# Patient Record
Sex: FEMALE | Race: WHITE | URBAN - METROPOLITAN AREA
[De-identification: names, ages, dates, MRNs, and addresses within clinical notes are randomized per-mention and may not be internally consistent; named-entity substitution may affect disease eponyms.]

---

## 2021-05-24 ENCOUNTER — RECORDS - HEALTHEAST (OUTPATIENT)
Dept: ADMINISTRATIVE | Facility: CLINIC | Age: 66
End: 2021-05-24

## 2021-05-30 ENCOUNTER — RECORDS - HEALTHEAST (OUTPATIENT)
Dept: ADMINISTRATIVE | Facility: CLINIC | Age: 66
End: 2021-05-30

## 2022-12-20 ENCOUNTER — TRANSFERRED RECORDS (OUTPATIENT)
Dept: HEALTH INFORMATION MANAGEMENT | Facility: CLINIC | Age: 67
End: 2022-12-20

## 2022-12-20 ENCOUNTER — MEDICAL CORRESPONDENCE (OUTPATIENT)
Dept: HEALTH INFORMATION MANAGEMENT | Facility: CLINIC | Age: 67
End: 2022-12-20

## 2022-12-21 ENCOUNTER — TRANSCRIBE ORDERS (OUTPATIENT)
Dept: OTHER | Age: 67
End: 2022-12-21

## 2022-12-21 DIAGNOSIS — M81.0 AGE-RELATED OSTEOPOROSIS WITHOUT CURRENT PATHOLOGICAL FRACTURE: ICD-10-CM

## 2022-12-21 DIAGNOSIS — E83.52 HYPERCALCEMIA: Primary | ICD-10-CM

## 2023-03-16 NOTE — TELEPHONE ENCOUNTER
RECORDS RECEIVED FROM: internal /ce   DATE RECEIVED: 6.5.23  Hypercalcemia; Age-related osteoporosis    NOTES (FOR ALL VISITS) STATUS DETAILS   OFFICE NOTES from referring provider internal  Bibiana Salter CNP   OFFICE NOTES from other specialist ce [facility]- 12.20.22 -endo  10.31.22- Obi   12.29.21 Joie    ED NOTES ce [facility]- 12.6.22 Shapin   11.29.22 Estiven  9.1.22-Obi   OPERATIVE REPORT  (thyroid, pituitary, adrenal, parathyroid)     MEDICATION LIST internal       LABS     DIABETES: HBGA1C, CREATININE, FASTING LIPIDS, MICROALBUMIN URINE, POTASSIUM, TSH, T4    THYROID: TSH, T4, CBC, THYRODLONULIN, TOTAL T3, FREE T4, CALCITONIN, CEA ce  Calcium- 9.12.22  CREATININE- 9.12.22  Parathyroid- 9.1.22  Vitamin D- 9.1.22  BMP- 7/1/22  potassum- 6.28.21  Lipid- 7/1/22  MICROALBUMIN - 6.25.24

## 2023-05-22 ENCOUNTER — TELEPHONE (OUTPATIENT)
Dept: ENDOCRINOLOGY | Facility: CLINIC | Age: 68
End: 2023-05-22
Payer: COMMERCIAL

## 2023-05-22 NOTE — TELEPHONE ENCOUNTER
Appointment Information and Google Maps map sent to pt. Via [employer].   Hyacinth Perez RN on 5/22/2023 at 1:45 PM     [facility] Call Center    Phone Message    May a detailed message be left on voicemail: yes     Reason for Call: Other: .      Per Patient is wanting to get directions to the clinic from her house sent to her at the address below. Patient states her  did not understand the directions from what was given to the patient via phone. Patient is wanting to get a call back when this has been done. Please advise    [address]   [address]  [place] [address]      Action Taken: Message routed to:  [facility]o    Travel Screening: Not Applicable

## 2023-06-05 ENCOUNTER — LAB (OUTPATIENT)
Dept: LAB | Facility: CLINIC | Age: 68
End: 2023-06-05
Payer: COMMERCIAL

## 2023-06-05 ENCOUNTER — PRE VISIT (OUTPATIENT)
Dept: ENDOCRINOLOGY | Facility: CLINIC | Age: 68
End: 2023-06-05

## 2023-06-05 ENCOUNTER — OFFICE VISIT (OUTPATIENT)
Dept: ENDOCRINOLOGY | Facility: CLINIC | Age: 68
End: 2023-06-05
Attending: NURSE PRACTITIONER
Payer: COMMERCIAL

## 2023-06-05 VITALS
HEIGHT: 66 IN | WEIGHT: 147.9 LBS | DIASTOLIC BLOOD PRESSURE: 86 MMHG | HEART RATE: 76 BPM | BODY MASS INDEX: 23.77 KG/M2 | OXYGEN SATURATION: 96 % | SYSTOLIC BLOOD PRESSURE: 147 MMHG

## 2023-06-05 DIAGNOSIS — M81.0 AGE-RELATED OSTEOPOROSIS WITHOUT CURRENT PATHOLOGICAL FRACTURE: ICD-10-CM

## 2023-06-05 DIAGNOSIS — E83.52 HYPERCALCEMIA: ICD-10-CM

## 2023-06-05 DIAGNOSIS — E21.3 HYPERPARATHYROIDISM (H): Primary | ICD-10-CM

## 2023-06-05 LAB
ALBUMIN SERPL BCG-MCNC: 4 G/DL (ref 3.5–5.2)
CALCIUM SERPL-MCNC: 11.9 MG/DL (ref 8.8–10.2)
CREAT SERPL-MCNC: 0.92 MG/DL (ref 0.51–0.95)
DEPRECATED CALCIDIOL+CALCIFEROL SERPL-MC: 21 UG/L (ref 20–75)
GFR SERPL CREATININE-BSD FRML MDRD: 68 ML/MIN/1.73M2
MAGNESIUM SERPL-MCNC: 2.3 MG/DL (ref 1.7–2.3)
PHOSPHATE SERPL-MCNC: 2.2 MG/DL (ref 2.5–4.5)
PTH-INTACT SERPL-MCNC: 132 PG/ML (ref 15–65)

## 2023-06-05 PROCEDURE — 82310 ASSAY OF CALCIUM: CPT | Performed by: PATHOLOGY

## 2023-06-05 PROCEDURE — 99204 OFFICE O/P NEW MOD 45 MIN: CPT | Performed by: INTERNAL MEDICINE

## 2023-06-05 PROCEDURE — 82565 ASSAY OF CREATININE: CPT | Performed by: PATHOLOGY

## 2023-06-05 PROCEDURE — 82040 ASSAY OF SERUM ALBUMIN: CPT | Performed by: PATHOLOGY

## 2023-06-05 PROCEDURE — 84100 ASSAY OF PHOSPHORUS: CPT | Performed by: PATHOLOGY

## 2023-06-05 PROCEDURE — 83970 ASSAY OF PARATHORMONE: CPT | Performed by: PATHOLOGY

## 2023-06-05 PROCEDURE — 36415 COLL VENOUS BLD VENIPUNCTURE: CPT | Performed by: PATHOLOGY

## 2023-06-05 PROCEDURE — 82306 VITAMIN D 25 HYDROXY: CPT | Performed by: PATHOLOGY

## 2023-06-05 PROCEDURE — 83735 ASSAY OF MAGNESIUM: CPT | Performed by: PATHOLOGY

## 2023-06-05 RX ORDER — HYDROCHLOROTHIAZIDE 25 MG/1
25 TABLET ORAL
COMMUNITY

## 2023-06-05 RX ORDER — BUPROPION HYDROCHLORIDE 200 MG/1
TABLET, EXTENDED RELEASE ORAL EVERY 12 HOURS
COMMUNITY
Start: 2022-07-01

## 2023-06-05 RX ORDER — LISINOPRIL 20 MG/1
TABLET ORAL
COMMUNITY
Start: 2023-05-12

## 2023-06-05 RX ORDER — ARIPIPRAZOLE 5 MG/1
1 TABLET ORAL DAILY
COMMUNITY
Start: 2022-07-01

## 2023-06-05 ASSESSMENT — PAIN SCALES - GENERAL: PAINLEVEL: NO PAIN (0)

## 2023-06-05 NOTE — NURSING NOTE
Chief Complaint   Patient presents with     New Patient     Hypercalcemia      Abraham Valdez CMA

## 2023-06-05 NOTE — PROGRESS NOTES
Endocrine Consult note    Attending Assessment/Plan :        Hypercalcemia  Age-related osteoporosis without current pathological fracture    Assessment:  -likely primary hyperparathyroidism given last labs  -serum calcium may be sightly elevated higher from hydrochlorothiazide but wouldn't expect this to cause marked hypercalcemia and especially not markedly elevated PTH  -vitamin D deficiency may be contributing slightly to elevated PTH.  Could consider low dose replacement as studies have shown in this setting doses of 400-800 international unit(s) vitamin D do not significantly raise calcium levels.  Higher doses will so would avoid that  -given osteoporosis, pt would be candidate for parathyroidectomy if labs confirm this diagnosis    Plan:  -recheck PHPT labs  -if labs consistent with PHPT, will order sestamibi and ENT referral  -instructed pt that if she gets referred to ENT and they decide on parathyroidectomy she should call our office as soon as her surgery date is scheduled to make an appt 2-3 months post op for calcium recheck.  She could also see her PCP for this followup locally    I have independently reviewed and interpreted labs, imaging as indicated.    RTC prn (or 2-3 months post op if having parathyroidectomy and if she would like me to see her for followup vs PCP)    Chief complaint:  Erma is a 67 year old female seen in consultation for metabolic bone.      HISTORY OF PRESENT ILLNESS    Erma is a 67F with pmh osteoporosis and hypercalcemia, here for a second opinion on hyperparathyroidism.     Has gotten reclast 3-4 times for osteoporosis. I do not have access to any dexa reports for her.     See below for previous labs.  Unclear if 24 hour urine was collected while on hydrochlorothiazide or not.  Pt did meet with surgeon up in [facility] who recommended parathyroidectomy.  She would like to have the operation done at [facility] if indicated.     She is not having mental status changes or  "chronic constipation.     No known hx of nephrolithiasis.     Also has vitamin D deficiency.  Not on vitamin D supplement currently due to hypercalcemia.       Endocrine relevant labs and/or imaging are as follows:  Labs done at OSH in Sept 2022:    24 hour urine Ca 65mg (13 mg/dL)  24 hour urine Cr 0.72g (144.8 mg/dL)  Serum Ca 11.4 mg/dL  Serum Cr 0.77    Phos 1.8  .9    Vitamin D 21.6  Most recent DEXA scan done 7/13/21 shows:  Left Hip     Femoral Neck:   BMD: 0.504 (g/cm )   T-score: -3.1     Total Hip:   BMD: 0.586 (g/cm )     T-score: -2.9     WHO classification: Osteoporosis.   BMD Comparison: (positive = increase, negative = decrease)   Baseline:  0.0%    Not Statistically Significant     Left Distal Forearm (One-Third Radius)     BMD:0.591 (g/cm )     T-score: -1.6     WHO classification: Low Bone Mass.   BMD Comparison: (positive = increase, negative = decrease)   Baseline:  -0.4%    Not Statistically Significant         REVIEW OF SYSTEMS    10 system ROS otherwise as per the HPI or negative    Past Medical History  History reviewed. No pertinent past medical history.    Medications  Current Outpatient Medications   Medication Sig Dispense Refill     acetaminophen 500 MG CAPS Take 500 mg by mouth every 6 hours as needed       ARIPiprazole (ABILIFY) 5 MG tablet Take 1 tablet by mouth daily       buPROPion (WELLBUTRIN SR) 200 MG 12 hr tablet Take by mouth every 12 hours       hydrochlorothiazide (HYDRODIURIL) 25 MG tablet Take 25 mg by mouth       lisinopril (ZESTRIL) 20 MG tablet        omeprazole (PRILOSEC) 20 MG DR capsule Take 1 capsule by mouth daily before breakfast         Allergies  No Known Allergies      Family History  family history is not on file.    Social History  Social History     Tobacco Use     Smoking status: Never       Physical Exam  BP (!) 147/86   Pulse 76   Ht 1.676 m (5' 6\")   Wt 67.1 kg (147 lb 14.4 oz)   SpO2 96%   BMI 23.87 kg/m    Body mass index is 23.87 " kg/m .    Physical Exam    GENERAL :  In no apparent distress  SKIN: Normal color, normal temperature     EYES: EOMI, No scleral icterus  NECK: No visible masses  RESP: No respiratory distress, normal effort  CARDIO: regular rate  ABDOMEN: flat, nondistended       NEURO: awake, alert, responds appropriately to questions   EXTREMITIES: No clubbing, cyanosis or edema.    I spent a total of 47 minutes on the day of this new patient visit on chart review, lab review, coordinating care, exam and counseling of patient

## 2023-06-06 ENCOUNTER — TELEPHONE (OUTPATIENT)
Dept: ENDOCRINOLOGY | Facility: CLINIC | Age: 68
End: 2023-06-06

## 2023-06-06 NOTE — TELEPHONE ENCOUNTER
Spoke w/ Pt: Confirms understanding of review and recommendation from Dr Anders. No questions at this time.   Hyacinth Perez, RN on 6/6/2023 at 10:44 AM           RE    Message  Received: Today  Sedrick Anders MD  [facility]  Please let patient know labs came back confirming overactive parathyroid gland.  I have placed orders for the nuclear medicine parathyroid scan and ENT referral so she should be hearing from both of those to schedule.  We will hold off on starting vitamin D supplement for now.     If/when a surgery date gets scheduled after seeing ENT she should make a followup with PCP or me about 2-3 months post op for lab recheck.

## 2023-06-07 ENCOUNTER — TELEPHONE (OUTPATIENT)
Dept: ENDOCRINOLOGY | Facility: CLINIC | Age: 68
End: 2023-06-07
Payer: COMMERCIAL

## 2023-06-07 NOTE — TELEPHONE ENCOUNTER
Patient would like for the ENT Referral to be sent to [facility] in [place] on [facility] this is all the information she was able to provide.

## 2023-06-07 NOTE — TELEPHONE ENCOUNTER
Per Patient request I faxed Dr Anders's ENT referral and parathyroid scan order to [facility] at [phone].  Radha Aguilar

## 2023-06-09 NOTE — TELEPHONE ENCOUNTER
FUTURE VISIT INFORMATION:      FUTURE VISIT INFORMATION:    Date: 7/17/23    Time: 2:40 PM    Location: [facility]  REFERRAL INFORMATION:    Referring provider: Sedrick Anders MD    Referring providers clinic: [facility]    Reason for visit/diagnosis:  M81.0 (ICD-10-CM) - Age-related osteoporosis without current pathological fracture; E21.3 (ICD-10-CM) - Hyperparathyroidism Referral from Sedrick Anders, Referral notes in EPIC, Pt appt in [facility] location    RECORDS REQUESTED FROM:       Clinic name Comments Records Status Imaging Status   [facility] 6/5/23 note- Sedrick Anders MD Epic    Imaging 10/12/18 [place] PARATHYROID CE Pending  req 6/9/23  PACS   [facility] 11/29/2022  Gen Surg note - Eliceo Jean-Baptiste MD  10/31/2022 Endo note- Tyler Esparza MD              June 9, 2023 at 8:03 AM - request image pushed to [facility] -Yancy  June 20, 2023 at 3:07 PM - Images resolved. -Yancy    *Appt cancel with ENT

## 2023-07-17 ENCOUNTER — PRE VISIT (OUTPATIENT)
Dept: OTOLARYNGOLOGY | Facility: CLINIC | Age: 68
End: 2023-07-17

## 2023-07-27 ENCOUNTER — TELEPHONE (OUTPATIENT)
Dept: ENDOCRINOLOGY | Facility: CLINIC | Age: 68
End: 2023-07-27
Payer: COMMERCIAL

## 2023-07-27 NOTE — TELEPHONE ENCOUNTER
[facility] for Dr Cailin Garcia. There is no referral from her in place. However, Pt is already seen by Dr Anders. Clinic number provided for questions.   Hyacinth Perez RN on 7/28/2023 at 8:58 AM       [facility] Call Center    Phone Message    May a detailed message be left on voicemail: yes     Reason for Call: Other: Dr. Garcia would like a call from Dr. Anders in regards to this patient he referred to her. Please contact the doctor at [phone]     Action Taken: Other: endo    Travel Screening: Not Applicable

## 2023-07-28 NOTE — TELEPHONE ENCOUNTER
Per call from DR Garcia's nurse, there is a miss communication, Dr Garcia would like to speak with Dr Anders regarding his referral to Dr Garcia. Dr Garcia believes patient's condition may be caused by patient's Hypercalciuria and PTH instead please have Dr Wasson call Dr Gracia to discuss at [phone] provider's cell phone

## 2023-08-11 NOTE — TELEPHONE ENCOUNTER
Dr. Garcia calling again requesting a call back.     Writer will send a secure chat message.    [facility]

## 2023-08-17 DIAGNOSIS — E21.3 HYPERPARATHYROIDISM (H): Primary | ICD-10-CM

## 2023-08-17 NOTE — PROGRESS NOTES
Discussed labs with ENT.  Unclear whether or not urine studies were done off thiazide.  Because the studies may imply FHH, would be best to repeat them after holding thiazide for 1 week.  Will order new 24 hour urine collection and have patient get serum calcium and creatinine the day she submits sample.     Attempted to call patient but did not reach her directly.  She did have identified VM so left her a message detailing above.

## 2023-09-01 ENCOUNTER — TELEPHONE (OUTPATIENT)
Dept: ENDOCRINOLOGY | Facility: CLINIC | Age: 68
End: 2023-09-01
Payer: COMMERCIAL

## 2023-09-01 NOTE — TELEPHONE ENCOUNTER
Health Call Center    Phone Message    May a detailed message be left on voicemail: yes     Reason for Call: Order(s): Other:     Reason for requested: Per Patient is wanting to have the orders that Dr. Anders is wanting patient to have done be sent [facility] Phone: [phone]. Patient is wanting to get a call back when this has been done to be able to schedule. Please advise.     Date needed: asap    Provider name: Chago    Action Taken: Message routed to:  [facility] Endo    Travel Screening: Not Applicable

## 2023-09-01 NOTE — TELEPHONE ENCOUNTER
Labs orders faxed to [facility] fax # [phone]. Called pt N/A left a message for pt that lab orders been faxed.      Juan Gallagher -General Care Navigator

## 2023-09-13 ENCOUNTER — TRANSFERRED RECORDS (OUTPATIENT)
Dept: HEALTH INFORMATION MANAGEMENT | Facility: CLINIC | Age: 68
End: 2023-09-13
Payer: COMMERCIAL

## 2023-09-18 ENCOUNTER — TELEPHONE (OUTPATIENT)
Dept: ENDOCRINOLOGY | Facility: CLINIC | Age: 68
End: 2023-09-18
Payer: COMMERCIAL

## 2023-09-18 DIAGNOSIS — E83.52 HYPERCALCEMIA: Primary | ICD-10-CM

## 2023-09-18 DIAGNOSIS — E83.59 HYPOCALCIURIA: ICD-10-CM

## 2023-09-18 NOTE — TELEPHONE ENCOUNTER
[facility] Call Center    Phone Message    May a detailed message be left on voicemail: yes     Reason for Call: Other: Per pt would like to know about the lab results she had. Per pt missed the call and is now carious. Please call her back. Thank you!     Action Taken: Message routed to:  [facility] ENDO    Travel Screening: Not Applicable

## 2023-09-26 NOTE — TELEPHONE ENCOUNTER
[facility] and asked for test results as well, please contact patient asap thank you

## 2023-09-26 NOTE — TELEPHONE ENCOUNTER
Pt called back again and is still waiting results of her lab tests. She would like a call back from the care team to go over them

## 2023-10-02 NOTE — TELEPHONE ENCOUNTER
[facility] Call Center    Phone Message    May a detailed message be left on voicemail: yes     Reason for Call: Other: Per pt would like if someone can call him back about his resylts. Per pt has waited for about 2-3 weeks now. Please and thank you!     Action Taken: Message routed to:  [facility] ENDO    Travel Screening: Not Applicable

## 2023-10-03 NOTE — TELEPHONE ENCOUNTER
Called patient to discuss labs after review.  Repeat labs do appear consistent with FHH, for which parathyroidectomy would not be indicated.  Given the degree of hypercalcemia, we should pursue genetic testing for FHH to very for sure.     Discussed with patient who is agreeable to seeing genetic counseling for this.  If genetic testing is negative, we may proceed with moderate dietary calcium intake of ~1000mg daily and repeat 24 hour urine to see if this changes the calcium secretion - she currently limits dairy due to her hypercalcemia.     Will alert pts ENT as well to plan.

## 2023-11-06 ENCOUNTER — TELEPHONE (OUTPATIENT)
Dept: ENDOCRINOLOGY | Facility: CLINIC | Age: 68
End: 2023-11-06
Payer: COMMERCIAL

## 2023-11-06 NOTE — TELEPHONE ENCOUNTER
[facility] Call Center    Phone Message    May a detailed message be left on voicemail: yes     Reason for Call: Other: See 9/18/23 tel enc: per Dr. Anders, he was going to be making referral for pt to have genetic testing done, pt calling to check on that status of that referral? No referral seen, please advise.      Action Taken: Other: Endo    Travel Screening: Not Applicable

## 2023-11-10 DIAGNOSIS — E83.52 HYPERCALCEMIA: Primary | ICD-10-CM

## 2023-11-10 DIAGNOSIS — E83.59 HYPOCALCIURIA: ICD-10-CM

## 2023-11-13 NOTE — TELEPHONE ENCOUNTER
Patient notified that referral for genetics was placed. By Dr Chago Heath, RN on 11/13/2023 at 8:35 AM

## 2023-12-07 ENCOUNTER — VIRTUAL VISIT (OUTPATIENT)
Dept: CONSULT | Facility: CLINIC | Age: 68
End: 2023-12-07
Attending: INTERNAL MEDICINE
Payer: COMMERCIAL

## 2023-12-07 DIAGNOSIS — E83.52 HYPERCALCEMIA: ICD-10-CM

## 2023-12-07 DIAGNOSIS — M81.0 AGE-RELATED OSTEOPOROSIS WITHOUT CURRENT PATHOLOGICAL FRACTURE: Primary | ICD-10-CM

## 2023-12-07 DIAGNOSIS — E83.59 HYPOCALCIURIA: ICD-10-CM

## 2023-12-07 PROCEDURE — 96040 HC GENETIC COUNSELING, EACH 30 MINUTES: CPT | Mod: TEL,95 | Performed by: GENETIC COUNSELOR, MS

## 2023-12-07 ASSESSMENT — PAIN SCALES - GENERAL: PAINLEVEL: NO PAIN (0)

## 2023-12-07 NOTE — NURSING NOTE
Is the patient currently in the state of [place]? YES    Visit mode:TELEPHONE    If the visit is dropped, the patient can be reconnected by: TELEPHONE VISIT: Phone number:   Telephone Information:   Mobile [phone]       Will anyone else be joining the visit? NO  (If patient encounters technical issues they should call [phone])    How would you like to obtain your AVS? MyChart    Are changes needed to the allergy or medication list? No    Reason for visit: No chief complaint on file.    Evelina BLANDF

## 2023-12-07 NOTE — LETTER
"2023      RE: Erma Gilliland  [address]   [address]  [place] [address]     Dear Colleague,    Thank you for the opportunity to participate in the care of your patient, Erma Gilliland, at the [facility] at [facility]. Please see a copy of my visit note below.    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     Erma was unaccompanied to this visit    History is obtained from Patient and electronic health record. I met with the family at the request of Dr. Sedrick Anders to obtain a personal and family history, discuss possible genetic contributions to her symptoms, and to obtain informed consent for genetic testing if indicated.      ASSESSMENT & PLAN  Erma is a 68 year old-year old female with hyperparathyroidism, osteoporosis, and hypercalcemia/hypocalcuria. Family history is significant for mother with hyperparathyroidism, hypercalcemia and osteoporosis.     Familial hypocalciuric hypercalcemia (FHH) is a rare genetic condition that can cause hypercalcemia and hypocalcuria.  It usually occurs due to pathogenic variants in the calcium-sensing receptor gene (CASR) gene that lead to decreased receptor activity. Erma's labs and dominant family history are consistent with this condition, so genetic testing is indicated. This will be used to determine what treatment she requires (e.g. parathyroidectomy is not indicated in FHH). It can also inform risk to relatives and allow for cascade testing. A three gene panel will be sent to [facility]: " AP2S1, CASR, GNA11. Erma provided informed consent, signed with verbal permission (COVID19)    buccal sample will be collected and sent to [facility] for custom [facility] panel (AP2S1, CASR, GNA11).   Testing will be billed through insurance. If the cost of testing is >$100, [employer] will call you to discuss payment options  After testing is initiated, results will be returned by phone in 10-21 days. Follow-up dependent on results  Contact information was provided should any questions arise in the future.    Addendum 12/21/23 received call from Erma about registering her sample with [facility].  She does not need to register the kit.  Provided instructions to return the sample.  Recommended she do over the MVR possible.     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  Her features include hyperparathyroidism and osteoporosis. She has gotten reclast 3-4 times for osteoporosis.  [facility] surgery recommended parathyroidectomy.  She was seen for a second opinion at [facility] endocrinology.  Based on her labs suspicious for hypercalcemic hypercalciuria, genetic testing for today's appointment was recommended.  This will be used to determine if she needs parathyroidectomy. She also has vitamin D deficiency.     No birth defects, delays, ID, HL, VL.  Reports dyslexia      Patient Active Problem List   Diagnosis    Hypercalcemia    Age-related osteoporosis without current pathological fracture       Pertinent studies/abnormal test results:   No history of genetic testing    Past Medical History:  No past medical history on file.    Past Surgical History:  No past surgical history on file.     FAMILY HISTORY  A three generation pedigree was obtained today and scanned into the EMR. The following information is significant:    Siblings  Full siblings: 2 well sisters  Paternal half siblings: None  Maternal half siblings: None    Maternal Family  Mother, Data  Unavailable: .  She had a history of osteoporosis diagnosed between 50-60, hyperparathyroidism, and hypercalcemia.  She passed due to colorectal cancer diagnosed at 84  Maternal grandfather: Unknown  Maternal grandmother: Unknown  Maternal aunts/uncles: Uncle who passed due to brain cancer diagnosed from 70-80  Maternal cousins: ?well  Maternal ancestry: Deferred    Paternal Family  Father, Data Unavailable: Well  Paternal grandfather: Unknown  Paternal grandmother: Unknown  Paternal aunts/uncles: Aunt who passed due to brain cancer diagnosed at ?70  Paternal cousins: ?well  Paternal ancestry: Deferred    The family history is otherwise negative for hypercalcemia, hypercalciuria, hyperparathyroidism, autoimmune conditions, failure to thrive, multiple fractures, osteoporosis, fatigue, renal insufficiency, chondrocalcinosis, calcifications, pancreatitis, and known genetic disorders. Consanguinity is denied.    DISCUSSION  Today we reviewed that our genetic material or DNA is responsible for how our bodies grow and develop. It can be thought of as an instruction manual. This instruction manual is made up of chapters called genes. Our genes are inherited on structures called chromosomes, of which we have 23 pairs for a total of 46. For each chromosome pair, one copy is inherited from the mother and one is inherited from the father. The chromosome pairs are numbered from 1 to 22, and the 23rd pair of chromsomes is called the sex chromsomes. These determine if we are a male or female.     Changes in the DNA sequence of a gene can cause the signs and symptoms of a genetic condition because the instructions it is providing to the body have been altered. This can be a small spelling error in the gene, a large duplicated piece of information, or a large missing piece of information.     Due to Erma's hyperparathyroidism, osteoporosis, and labs, genetic testing for familial hypercalcemic hypercalciuria is  indicated.  This will be sent to [facility].  They will analyze 3 genes that are known to cause this condition. The potential results were discussed including a positive, negative, or variant of uncertain significance.     A positive result occurs when a harmful genetic variant is found within a gene that causes the gene to malfunction. A positive result may provide more information on appropriate clinical management for Erma and may provide information on additional potential health risks associated with Erma's diagnosis.  A positive result can also have implications for the health and reproductive risks of other relatives.  A negative result occurs when no harmful genetic variants are found in the genes tested. A negative result would not completely rule out a possible genetic cause for Erma's symptoms.  Not all variants in our genes cause disease.  Sometimes, it can be difficult for the laboratory to determine whether or not a variant in a gene would cause it to malfunction.  In these scenarios, the lab classifies the result as a variant of unknown significance (VUS). Follow-up testing of relatives may be beneficial in clarifying the meaning of this result.    Management and surveillance of Erma will depend on the genetic test results. It can also help predict the recurrence risk for Erma and other family members to have another child with similar healthcare needs.    Billing  A buccal swab will be mailed to the home. The family must collect, label, and mail back the sample. The label must include name, date of birth, and date of collection. Detailed instructions and a prepaid shipping envelope are included in the box..     You will receive a text/email from [facility] once they receive the sample. Testing can be billed one of two ways: patient pay or insurance billing. Patient-pay bills the patient directly at a reduced cost ($250 for panels) and does not utilize insurance. [facility] accepts  Medicare (including managed Medicare) and Medicaid (including managed Medicaid) for all panels.    Although patients may receive an explanation of benefits (EOB) from Medicare or Medicaid, an EOB is not a bill. Most patients who have Medicare or Medicaid will not receive a bill from [facility].If the out-of-pocket cost is over $100, [employer] will call you once to discuss payment options.          Approximate Time Spent in Consultation: 30 min         This note was written with the assistance of voice recognition software and may contain occasional typographic errors. Please contact our office if you identify errors requiring correction.    Virtual Visit Details    Type of service:  Telephone Visit   Phone call duration: 30 minutes

## 2023-12-07 NOTE — PROGRESS NOTES
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     Erma was unaccompanied to this visit    History is obtained from Patient and electronic health record. I met with the family at the request of Dr. Sedrick Anders to obtain a personal and family history, discuss possible genetic contributions to her symptoms, and to obtain informed consent for genetic testing if indicated.      ASSESSMENT & PLAN  Erma is a 68 year old-year old female with hyperparathyroidism, osteoporosis, and hypercalcemia/hypocalcuria. Family history is significant for mother with hyperparathyroidism, hypercalcemia and osteoporosis.     Familial hypocalciuric hypercalcemia (FHH) is a rare genetic condition that can cause hypercalcemia and hypocalcuria.  It usually occurs due to pathogenic variants in the calcium-sensing receptor gene (CASR) gene that lead to decreased receptor activity. Erma's labs and dominant family history are consistent with this condition, so genetic testing is indicated. This will be used to determine what treatment she requires (e.g. parathyroidectomy is not indicated in FHH). It can also inform risk to relatives and allow for cascade testing. A three gene panel will be sent to [facility]: AP2S1, CASR, GNA11. Erma provided informed consent, signed with verbal permission ([email or web address])    buccal sample will be collected and sent to [facility] for custom FHH panel (AP2S1, CASR, GNA11).   Testing will be billed through insurance. If the cost of testing is >$100, [facility] will call you to discuss payment options  After testing is initiated, results will be returned by " phone in 10-21 days. Follow-up dependent on results  Contact information was provided should any questions arise in the future.    Addendum 23 received call from Erma about registering her sample with [employer].  She does not need to register the kit.  Provided instructions to return the sample.  Recommended she do over the MVR possible.     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  Her features include hyperparathyroidism and osteoporosis. She has gotten reclast 3-4 times for osteoporosis.  [facility] surgery recommended parathyroidectomy.  She was seen for a second opinion at [facility] endocrinology.  Based on her labs suspicious for hypercalcemic hypercalciuria, genetic testing for today's appointment was recommended.  This will be used to determine if she needs parathyroidectomy. She also has vitamin D deficiency.     No birth defects, delays, ID, HL, VL.  Reports dyslexia      Patient Active Problem List   Diagnosis    Hypercalcemia    Age-related osteoporosis without current pathological fracture       Pertinent studies/abnormal test results:   No history of genetic testing    Past Medical History:  No past medical history on file.    Past Surgical History:  No past surgical history on file.     FAMILY HISTORY  A three generation pedigree was obtained today and scanned into the EMR. The following information is significant:    Siblings  Full siblings: 2 well sisters  Paternal half siblings: None  Maternal half siblings: None    Maternal Family  Mother, Data Unavailable: .  She had a history of osteoporosis diagnosed between 50-60, hyperparathyroidism, and hypercalcemia.  She passed due to colorectal cancer diagnosed at 84  Maternal grandfather: Unknown  Maternal grandmother: Unknown  Maternal aunts/uncles: Uncle who passed due to brain cancer diagnosed from 70-80  Maternal cousins: ?well  Maternal ancestry:  Deferred    Paternal Family  Father, Data Unavailable: Well  Paternal grandfather: Unknown  Paternal grandmother: Unknown  Paternal aunts/uncles: Aunt who passed due to brain cancer diagnosed at ?70  Paternal cousins: ?well  Paternal ancestry: Deferred    The family history is otherwise negative for hypercalcemia, hypercalciuria, hyperparathyroidism, autoimmune conditions, failure to thrive, multiple fractures, osteoporosis, fatigue, renal insufficiency, chondrocalcinosis, calcifications, pancreatitis, and known genetic disorders. Consanguinity is denied.    DISCUSSION  Today we reviewed that our genetic material or DNA is responsible for how our bodies grow and develop. It can be thought of as an instruction manual. This instruction manual is made up of chapters called genes. Our genes are inherited on structures called chromosomes, of which we have 23 pairs for a total of 46. For each chromosome pair, one copy is inherited from the mother and one is inherited from the father. The chromosome pairs are numbered from 1 to 22, and the 23rd pair of chromsomes is called the sex chromsomes. These determine if we are a male or female.     Changes in the DNA sequence of a gene can cause the signs and symptoms of a genetic condition because the instructions it is providing to the body have been altered. This can be a small spelling error in the gene, a large duplicated piece of information, or a large missing piece of information.     Due to Erma's hyperparathyroidism, osteoporosis, and labs, genetic testing for familial hypercalcemic hypercalciuria is indicated.  This will be sent to [facility].  They will analyze 3 genes that are known to cause this condition. The potential results were discussed including a positive, negative, or variant of uncertain significance.     A positive result occurs when a harmful genetic variant is found within a gene that causes the gene to malfunction. A positive result may provide more  information on appropriate clinical management for Erma and may provide information on additional potential health risks associated with Erma's diagnosis.  A positive result can also have implications for the health and reproductive risks of other relatives.  A negative result occurs when no harmful genetic variants are found in the genes tested. A negative result would not completely rule out a possible genetic cause for Erma's symptoms.  Not all variants in our genes cause disease.  Sometimes, it can be difficult for the laboratory to determine whether or not a variant in a gene would cause it to malfunction.  In these scenarios, the lab classifies the result as a variant of unknown significance (VUS). Follow-up testing of relatives may be beneficial in clarifying the meaning of this result.    Management and surveillance of Erma will depend on the genetic test results. It can also help predict the recurrence risk for Eram and other family members to have another child with similar healthcare needs.    Billing  A buccal swab will be mailed to the home. The family must collect, label, and mail back the sample. The label must include name, date of birth, and date of collection. Detailed instructions and a prepaid shipping envelope are included in the box..     You will receive a text/email from [facility] once they receive the sample. Testing can be billed one of two ways: patient pay or insurance billing. Patient-pay bills the patient directly at a reduced cost ($250 for panels) and does not utilize insurance. [facility] accepts Medicare (including managed Medicare) and Medicaid (including managed Medicaid) for all panels.    Although patients may receive an explanation of benefits (EOB) from Medicare or Medicaid, an EOB is not a bill. Most patients who have Medicare or Medicaid will not receive a bill from [facility].If the out-of-pocket cost is over $100, [facility] will call you once to discuss  payment options.          Approximate Time Spent in Consultation: 30 min         This note was written with the assistance of voice recognition software and may contain occasional typographic errors. Please contact our office if you identify errors requiring correction.

## 2024-01-17 ENCOUNTER — TELEPHONE (OUTPATIENT)
Dept: CONSULT | Facility: CLINIC | Age: 69
End: 2024-01-17
Payer: COMMERCIAL

## 2024-01-17 NOTE — LETTER
"    January 18, 2024      TO: Erma Gilliland  [address]   [address]  [place] [address]       Dear Erma,    Thank you for allowing us to be a part of Erma's healthcare at the [facility].  At your most recent visit, genetic testing for familial hypocalciuric hypercalcemia (FHH) was pursued.  As we have discussed by telephone this returned positive.  This letter will serve as a brief summary of our visit and these results.  I have also included a copy of the lab report for your records.       Genetics  Genes are long stretches of DNA that are responsible for how our bodies look and how our bodies work.  Our genes are inherited on structures called chromosomes of which we have 23 pairs.  The first 22 pairs of chromosomes are the same in males and females while the 23rd pair of chromosomes, the sex chromosomes, are different in males and females.  Males have one copy of the X-chromosome and one copy of the Y-chromosome while females have two copies of the X-chromosome.  We all have variations in our chromosomes and genes that make us unique, but some variations, also called mutations, can result in a genetic condition.       Genetic Testing  To test for FHH, a test called Next Generation Sequencing was completed, which is abbreviated as NGS. This test looks for spelling errors in specific genes. It can also find small missing or added pieces of information within these genes. The genes that were analyzed included CASR, AP2S2, and GNA11. Results can be positive (providing a genetic diagnosis), negative (normal), or uncertain (a genetic alteration was found, but we cannot be certain that it causes disease). This test was positive for [id number] FHH (also called FHH type 3 or FHH3).    Your genetic testing was performed at [facility] ([id number]). Your genetic test results found that you have a spelling error in the AP2S1 gene. 44 chemical letters into the gene, you have a letter \"T\" instead " "of a letter \"G\". This causes a change in the AP2S1 protein, which leads to issues with the way it regulates calcium in your body. 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     Familial Hypocalciuric Hypercalcemia (FHH)  Patients with FHH have elevated calcium levels in their blood, reduced levels in their urine, and high-normal PTH levels. Having first-degree family members with a similar biochemical profile are highly suggestive of FHH, and distinguish this syndrome from typical primary hyperparathyroidism. Erma's personal and family history is consistent with her diagnosis of FHH3.    Most individuals with FHH do not have symptoms and do not require treatment like parathyroidectomy. More rarely, some FHH3 patients may have clinical symptoms such as muscle weakness, lethargy, chondrocalcinosis, recurrent pancreatitis and low bone mass. ADHD, language impairment, and learning disabilities have been reported as well. Most patients do not require treatment, but some symptomatic patients were successfully treated with cinacalcet (pancreatitis) or bisphosphonates (osteoporosis). Management for Erma will be determined by her endocrinologist/ENT.    Inheritance of FHH  We have two copies of the AP2S1 gene. One we inherit from our mother, and one we inherited from our father. Individuals with FHH3 have one alteration in one copy of the AP2S1 gene. The other copy is typical. This is called \"autosomal dominant inheritance\". When and if someone with FHH3 considers having children, there is a   1 in 2 chance of passing on the altered gene. This child would be expected to have calcium differences, but they may never have symptoms related to it.  1 in 2 chance of not passing on the altered gene. This child would not be expected to have symptoms.    Based on your " mother's history, we suspect this AP2S1 variant was inherited from your mother. Your sisters would each have a 1 in 2 chance of having FHH3 as well, even if they are assymptomatic. We recommend you inform them of your genetic test result and share a copy of her genetic test report with them. If they are local to [place], they can call me for an appointment. For your sister in [place], she can request a referral to a local genetic counselor from her PCP.      Thank you for allowing us to be a part of your healthcare. Do not hesitate to reach out as questions arise.    Sincerely,     Chayito Perez [facility]  Genetic Counselor   [facility]   Phone: [phone]

## 2024-01-17 NOTE — TELEPHONE ENCOUNTER
Reason for Call  Called Erma to discuss the results of Erma's genetic testing for familial hypocalciuric hypercalcemia (FHH).     Results  Next Generation Sequencing (NGS) for 3 FHH genes was completed at [facility]. These results were positive for FHH3 due to a heterozygous pathogenic variant in AP2S1.    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 This is the second most common cause of FHH after CASR-FHH. The variant that Erma has has been reported in other FHH patients previously.    Patients with FHH have elevated calcium levels in their blood, reduced levels in their urine, and high-normal PTH levels. Having first-degree family members with a similar biochemical profile are highly suggestive of FHH, and distinguish this syndrome from typical primary hyperparathyroidism. Erma's personal and family history is consistent with her diagnosis of FHH3.    Most individuals with FHH do not have symptoms and do not require treatment like parathyroidectomy. More rarely, some FHH3 patients may have clinical symptoms such as muscle weakness, lethargy, chondrocalcinosis, recurrent pancreatitis and low bone mass. ADHD, language impairment, and learning disabilities have been reported as well. Most patients do not require treatment, but some symptomatic patients were successfully treated with cinacalcet (pancreatitis) or bisphosphonates (osteoporosis). Management for Erma will be determined by her endocrinologist/ENT.    We reviewed the inheritance pattern for FHH3 (autosomal dominant). Erma's mother has passed and she does not have contact with her other maternal relatives. Her sisters may be interested in genetic testing. Their risk of having FHH3 is 50%, assuming mother's history was also due to FHH3. Erma will inform them of her  result and share a copy of her genetic test report with them. If they are local to [place], they can call me for an appointment. One of Erma's sisters lives in [place]. She can request a referral to a local genetic counselor from her PCP.    Plan  We will forward results to Dr. Anders, who may wish to follow-up with Erma.  I will mail results to home alongside results letter  Emra will share this result with her sisters. They can see a  for testing. I would be happy to see relatives in [place]  No additional questions or concerns. Contact information provided    Chayito Perez [facility]  Genetic Counselor   [facility]   Phone: [phone]

## 2024-03-01 ENCOUNTER — DOCUMENTATION ONLY (OUTPATIENT)
Dept: ENDOCRINOLOGY | Facility: CLINIC | Age: 69
End: 2024-03-01
Payer: COMMERCIAL

## 2024-03-01 NOTE — PROGRESS NOTES
"Genetic testing completed to work up hypercalcemia for FHH.  Genetic testing confirmed FHH as cause for hypercalcemia which does not require treatment.     \"Results  Next Generation Sequencing (NGS) for 3 FHH genes was completed at [facility]. These results were positive for FHH3 due to a heterozygous pathogenic variant in AP2S1.     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 This is the second most common cause of FHH after CASR-FHH. The variant that Erma has has been reported in other FHH patients previously.\"  "